# Patient Record
Sex: FEMALE
[De-identification: names, ages, dates, MRNs, and addresses within clinical notes are randomized per-mention and may not be internally consistent; named-entity substitution may affect disease eponyms.]

---

## 2018-04-03 ENCOUNTER — HOSPITAL ENCOUNTER (OUTPATIENT)
Dept: HOSPITAL 53 - M LAB REF | Age: 23
End: 2018-04-03
Attending: PHYSICIAN ASSISTANT
Payer: COMMERCIAL

## 2018-04-03 DIAGNOSIS — R19.7: Primary | ICD-10-CM

## 2018-04-03 PROCEDURE — 87177 OVA AND PARASITES SMEARS: CPT

## 2018-08-16 ENCOUNTER — HOSPITAL ENCOUNTER (OUTPATIENT)
Dept: HOSPITAL 53 - M LAB REF | Age: 23
End: 2018-08-16
Attending: PHYSICIAN ASSISTANT
Payer: COMMERCIAL

## 2018-08-16 DIAGNOSIS — J02.9: Primary | ICD-10-CM

## 2018-08-16 PROCEDURE — 87070 CULTURE OTHR SPECIMN AEROBIC: CPT

## 2019-07-15 ENCOUNTER — HOSPITAL ENCOUNTER (EMERGENCY)
Dept: HOSPITAL 53 - M ED | Age: 24
Discharge: HOME | End: 2019-07-15
Payer: COMMERCIAL

## 2019-07-15 VITALS — HEIGHT: 67 IN | WEIGHT: 136.69 LBS | BODY MASS INDEX: 21.45 KG/M2

## 2019-07-15 VITALS — DIASTOLIC BLOOD PRESSURE: 70 MMHG | SYSTOLIC BLOOD PRESSURE: 112 MMHG

## 2019-07-15 DIAGNOSIS — R11.0: ICD-10-CM

## 2019-07-15 DIAGNOSIS — E03.9: ICD-10-CM

## 2019-07-15 DIAGNOSIS — Z79.899: ICD-10-CM

## 2019-07-15 DIAGNOSIS — R10.31: Primary | ICD-10-CM

## 2019-07-15 DIAGNOSIS — Z79.890: ICD-10-CM

## 2019-07-15 DIAGNOSIS — F33.9: ICD-10-CM

## 2019-07-15 DIAGNOSIS — F41.9: ICD-10-CM

## 2019-07-15 DIAGNOSIS — Z79.3: ICD-10-CM

## 2019-07-15 LAB
ALBUMIN SERPL BCG-MCNC: 4.3 GM/DL (ref 3.2–5.2)
ALT SERPL W P-5'-P-CCNC: 15 U/L (ref 12–78)
B-HCG SERPL QL: NEGATIVE
BASOPHILS # BLD AUTO: 0.1 10^3/UL (ref 0–0.2)
BASOPHILS NFR BLD AUTO: 0.6 % (ref 0–1)
BILIRUB CONJ SERPL-MCNC: 0.3 MG/DL (ref 0–0.2)
BILIRUB SERPL-MCNC: 1 MG/DL (ref 0.2–1)
BUN SERPL-MCNC: 16 MG/DL (ref 7–18)
CALCIUM SERPL-MCNC: 9.1 MG/DL (ref 8.5–10.1)
CHLORIDE SERPL-SCNC: 105 MEQ/L (ref 98–107)
CO2 SERPL-SCNC: 27 MEQ/L (ref 21–32)
CREAT SERPL-MCNC: 0.96 MG/DL (ref 0.55–1.3)
EOSINOPHIL # BLD AUTO: 0.2 10^3/UL (ref 0–0.5)
EOSINOPHIL NFR BLD AUTO: 2.5 % (ref 0–3)
GFR SERPL CREATININE-BSD FRML MDRD: > 60 ML/MIN/{1.73_M2} (ref 60–?)
GLUCOSE SERPL-MCNC: 89 MG/DL (ref 70–100)
HCT VFR BLD AUTO: 42.4 % (ref 36–47)
HGB BLD-MCNC: 14.4 G/DL (ref 12–15.5)
LIPASE SERPL-CCNC: 175 U/L (ref 73–393)
LYMPHOCYTES # BLD AUTO: 3.6 10^3/UL (ref 1.5–6.5)
LYMPHOCYTES NFR BLD AUTO: 45.2 % (ref 24–44)
MCH RBC QN AUTO: 31.4 PG (ref 27–33)
MCHC RBC AUTO-ENTMCNC: 34 G/DL (ref 32–36.5)
MCV RBC AUTO: 92.6 FL (ref 80–96)
MONOCYTES # BLD AUTO: 0.5 10^3/UL (ref 0–0.8)
MONOCYTES NFR BLD AUTO: 6.7 % (ref 0–5)
NEUTROPHILS # BLD AUTO: 3.5 10^3/UL (ref 1.8–7.7)
NEUTROPHILS NFR BLD AUTO: 44.9 % (ref 36–66)
PLATELET # BLD AUTO: 300 10^3/UL (ref 150–450)
POTASSIUM SERPL-SCNC: 3.8 MEQ/L (ref 3.5–5.1)
PROT SERPL-MCNC: 8.1 GM/DL (ref 6.4–8.2)
RBC # BLD AUTO: 4.58 10^6/UL (ref 4–5.4)
SODIUM SERPL-SCNC: 138 MEQ/L (ref 136–145)
WBC # BLD AUTO: 7.9 10^3/UL (ref 4–10)

## 2019-07-15 NOTE — REPVR
EXAM: 

 US Pelvis Complete   (transabdominal and transvaginal)



EXAM DATE/TIME: 

 7/15/19  (7:34am)



CLINICAL HISTORY: 

 24 year old female with RLQ pain    



TECHNIQUE: 

 Imaging protocol:  Real-time transabdominal and transvaginal pelvic ultrasound 

with image documentation. Complete examination. 



COMPARISON: 

 No relevant prior studies available



FINDINGS:

The LMP is reported to be:  approx. 4 weeks ago



The uterus is anteverted, measuring 6.9 x 2.0 x 4.1 cm in dimensions.

      No uterine mass is seen.

      The endometrium is thin (2 mm thickness).



The right ovary measures 2.1 x 1.7 x 1.5 cm in size.

The left ovary measures 1.4 x 1.4 x 1.5 cm in size.

There is no evidence of ovarian torsion on Doppler evaluation.



No free pelvic fluid is seen.   

No solid adnexal masses.

The urinary bladder appears unremarkable. No stones nor mass.

     Urinary bladder volume = 114 ml.





IMPRESSION:

No acute pathology.

No abnormal mass.  No free fluid.

The ovaries are unremarkable, with no evidence of torsion.



Electronically signed by: Teresa Galo On 07/15/2019  08:13:44 AM